# Patient Record
Sex: FEMALE | Race: WHITE | Employment: OTHER | URBAN - METROPOLITAN AREA
[De-identification: names, ages, dates, MRNs, and addresses within clinical notes are randomized per-mention and may not be internally consistent; named-entity substitution may affect disease eponyms.]

---

## 2018-06-09 ENCOUNTER — HOSPITAL ENCOUNTER (EMERGENCY)
Facility: CLINIC | Age: 68
Discharge: HOME | End: 2018-06-09
Attending: FAMILY MEDICINE | Admitting: FAMILY MEDICINE
Payer: COMMERCIAL

## 2018-06-09 VITALS
RESPIRATION RATE: 16 BRPM | TEMPERATURE: 98.4 F | HEIGHT: 66 IN | SYSTOLIC BLOOD PRESSURE: 157 MMHG | OXYGEN SATURATION: 99 % | DIASTOLIC BLOOD PRESSURE: 77 MMHG | HEART RATE: 77 BPM

## 2018-06-09 DIAGNOSIS — H10.211 CHEMICAL CONJUNCTIVITIS OF RIGHT EYE: ICD-10-CM

## 2018-06-09 PROCEDURE — 99283 EMERGENCY DEPT VISIT LOW MDM: CPT | Mod: Z6 | Performed by: FAMILY MEDICINE

## 2018-06-09 PROCEDURE — 99283 EMERGENCY DEPT VISIT LOW MDM: CPT

## 2018-06-09 RX ORDER — TOBRAMYCIN AND DEXAMETHASONE 3; 1 MG/ML; MG/ML
SUSPENSION/ DROPS OPHTHALMIC
Qty: 1 BOTTLE | Refills: 0 | Status: SHIPPED | OUTPATIENT
Start: 2018-06-09

## 2018-06-09 RX ORDER — BUPROPION HYDROCHLORIDE 75 MG/1
1 TABLET ORAL DAILY
COMMUNITY
Start: 2018-02-15 | End: 2019-02-15

## 2018-06-09 RX ORDER — FLUOXETINE 10 MG/1
30 CAPSULE ORAL DAILY
COMMUNITY
Start: 2018-02-15 | End: 2019-02-15

## 2018-06-09 RX ORDER — AMOXICILLIN 500 MG/1
4 CAPSULE ORAL
COMMUNITY
Start: 2017-11-01 | End: 2018-11-01

## 2018-06-09 RX ORDER — SIMVASTATIN 20 MG
1 TABLET ORAL DAILY
COMMUNITY
Start: 2017-12-26 | End: 2018-12-26

## 2018-06-09 RX ORDER — BUPROPION HYDROCHLORIDE 150 MG/1
2 TABLET ORAL DAILY
COMMUNITY
Start: 2018-02-15 | End: 2019-02-15

## 2018-06-09 ASSESSMENT — ENCOUNTER SYMPTOMS
EYE DISCHARGE: 0
EYE REDNESS: 1
PHOTOPHOBIA: 0
EYE ITCHING: 0
EYE PAIN: 1

## 2018-06-09 ASSESSMENT — VISUAL ACUITY
OS: 20/50
OD: 20/70

## 2018-06-09 NOTE — ED AVS SNAPSHOT
[facility] Emergency Department    [address] [facility] [address]    Phone:  [phone]    Fax:  [phone]                                       Gabriella Carballo   MRN: [id number]    Department:  [facility] Emergency Department   Date of Visit:  6/9/2018           After Visit Summary Signature Page     I have received my discharge instructions, and my questions have been answered. I have discussed any challenges I see with this plan with the nurse or doctor.    ..........................................................................................................................................  Patient/Patient Representative Signature      ..........................................................................................................................................  Patient Representative Print Name and Relationship to Patient    ..................................................               ................................................  Date                                            Time    ..........................................................................................................................................  Reviewed by Signature/Title    ...................................................              ..............................................  Date                                                            Time

## 2018-06-09 NOTE — ED PROVIDER NOTES
"  History     Chief Complaint   Patient presents with     Eye Problem     put her contact  in her eye instead of her eye drops. redness and swelling of eye, did rinse for about 10 min before coming in     HPI  Gabriella Carballo is a 68 year old female who here with eye problem.  Today she was going to put in some contacts and she inadvertently use the contact lens cleaning solution instead of the eyedrops in her right eye.  She had fairly immediate burning and discomfort from this.  She flushed immediately with water.  Her vision has been slightly blurry since this incident.  She has normally blurry vision due to a past history of keratoconus.  She has been using sunglasses today as she went to her granddaughter's high school graduation party.    She will be driving back to her home in [place] tomorrow although her  will be doing the majority of the driving.    Problem List:    There are no active problems to display for this patient.       Past Medical History:    No past medical history on file.    Past Surgical History:    No past surgical history on file.    Family History:    No family history on file.    Social History:  Marital Status:   [2]  Social History   Substance Use Topics     Smoking status: Not on file     Smokeless tobacco: Not on file     Alcohol use Not on file        Medications:      tobramycin-dexamethasone (TOBRADEX) 0.3-0.1 % ophthalmic susp         Review of Systems   Eyes: Positive for pain and redness. Negative for photophobia, discharge, itching and visual disturbance.       Physical Exam   BP: 157/77  Pulse: 77  Temp: 98.4  F (36.9  C)  Resp: 16  Height: 167.6 cm (5' 6\")  SpO2: 99 %  Visual Acuity-Left: 20/50  Visual Acuity-Right: 20/70      Physical Exam   Constitutional: She appears well-developed and well-nourished. No distress.   HENT:   Head: Normocephalic and atraumatic.   Eyes: Right eye exhibits no discharge and no exudate. No foreign body present in the right eye. " Right conjunctiva is injected (with marked edema).   Neck: Normal range of motion. Neck supple.   Skin: She is not diaphoretic.       ED Course     ED Course     Procedures               Critical Care time:  none               No results found for this or any previous visit (from the past 24 hour(s)).    Medications - No data to display    Assessments & Plan (with Medical Decision Making)     I have reviewed the nursing notes.    I have reviewed the findings, diagnosis, plan and need for follow up with the patient.   Patient has a chemical conjunctivitis with edema.  I will treat with eyedrop combination cortisone and antibiotic.  She will follow-up with her ophthalmologist when she gets back in [place] if not improving or if getting worse.  Sooner as needed.    New Prescriptions    TOBRAMYCIN-DEXAMETHASONE (TOBRADEX) 0.3-0.1 % OPHTHALMIC SUSP    1 drop to the affected eye/eyes every 2 hours x doses, then every 6 hours for 2 days       Final diagnoses:   Chemical conjunctivitis of right eye       6/9/2018   [facility] EMERGENCY DEPARTMENT     Jed Meyer MD  06/09/18 1862

## 2018-06-09 NOTE — DISCHARGE INSTRUCTIONS
Conjunctivitis Caused by Irritation     Your healthcare provider may prescribe eye drops to help relieve symptoms.     Conjunctivitis may be caused by allergies to animals, chemicals, or other irritants. This includes eye drops. Most eye drops contain chemicals (preservatives) that may irritate your eyes. The problem can keep coming back. This can lead to an eye infection. Treatment involves relieving your symptoms and avoiding the irritant. If you have an infection, it will be treated.  Allergies  Grass, pollen, dust, mold, and animals are common causes of allergies. They can make your eyes red, watery, and itchy. In most cases, both eyes are affected.  Treatment  The best way to control an allergy is to avoid its source. Cold compresses and eye drops can help reduce the swelling. They can also help relieve redness and itching. If your allergy is severe, your healthcare provider may prescribe eye drops or oral medicines. Symptoms may take several weeks to clear up.  Other irritants  Pollution, smoke, contact lenses, and makeup can irritate your eyes. Your eyes can get red, sore, puffy, and watery. One or both eyes may become irritated.  Treatment  The best thing to do is avoid the irritant. Artificial tears can help flush out the eye and lubricate the surface. Your healthcare provider may also prescribe eye drops to reduce swelling and relieve redness. In some cases, you may have to stop wearing contact lenses or certain types of eye makeup.  Date Last Reviewed: 10/1/2017    0481-1904 [employer]. [address] [place] [address], [place] [address]. All rights reserved. This information is not intended as a substitute for professional medical care. Always follow your healthcare professional's instructions.

## 2018-06-09 NOTE — ED NOTES
Pt was putting eye gtts in at 1200 today when she realized she put contact cleaning solution in her R eye rather than wetting gtts.  Pt has flushed R eye several times t/o the day.  Tetanus UTD per pt

## 2018-06-09 NOTE — ED AVS SNAPSHOT
[facility] Emergency Department    [address] [address]    Phone:  [phone]    Fax:  [phone]                                       Gabriella Carballo   MRN: [id number]    Department:  [facility] Emergency Department   Date of Visit:  6/9/2018           Patient Information     Date Of Birth          1950        Your diagnoses for this visit were:     Chemical conjunctivitis of right eye        You were seen by Jed Meyer MD.      Follow-up Information     Follow up with your eye doctor.    Why:  If symptoms worsen        Discharge Instructions         Conjunctivitis Caused by Irritation     Your healthcare provider may prescribe eye drops to help relieve symptoms.     Conjunctivitis may be caused by allergies to animals, chemicals, or other irritants. This includes eye drops. Most eye drops contain chemicals (preservatives) that may irritate your eyes. The problem can keep coming back. This can lead to an eye infection. Treatment involves relieving your symptoms and avoiding the irritant. If you have an infection, it will be treated.  Allergies  Grass, pollen, dust, mold, and animals are common causes of allergies. They can make your eyes red, watery, and itchy. In most cases, both eyes are affected.  Treatment  The best way to control an allergy is to avoid its source. Cold compresses and eye drops can help reduce the swelling. They can also help relieve redness and itching. If your allergy is severe, your healthcare provider may prescribe eye drops or oral medicines. Symptoms may take several weeks to clear up.  Other irritants  Pollution, smoke, contact lenses, and makeup can irritate your eyes. Your eyes can get red, sore, puffy, and watery. One or both eyes may become irritated.  Treatment  The best thing to do is avoid the irritant. Artificial tears can help flush out the eye and lubricate the surface. Your healthcare provider may also prescribe eye drops to  reduce swelling and relieve redness. In some cases, you may have to stop wearing contact lenses or certain types of eye makeup.  Date Last Reviewed: 10/1/2017    4237-2168 [employer]. [address] [place] [address], [place] [address]. All rights reserved. This information is not intended as a substitute for professional medical care. Always follow your healthcare professional's instructions.          24 Hour Appointment Hotline       To make an appointment at any [facility], call [phone] ([phone]). If you don't have a family doctor or clinic, we will help you find one. [facility] are conveniently located to serve the needs of you and your family.             Review of your medicines      START taking        Dose / Directions Last dose taken    tobramycin-dexamethasone 0.3-0.1 % ophthalmic susp   Commonly known as:  TOBRADEX   Quantity:  1 Bottle        1 drop to the affected eye/eyes every 2 hours x doses, then every 6 hours for 2 days   Refills:  0                Prescriptions were sent or printed at these locations (1 Prescription)                   [facility] [address] [address]    Telephone:  [phone]   Fax:  [phone]   Hours:                  E-Prescribed (1 of 1)         tobramycin-dexamethasone (TOBRADEX) 0.3-0.1 % ophthalmic susp                Orders Needing Specimen Collection     None      Pending Results     No orders found from 6/7/2018 to 6/10/2018.            Pending Culture Results     No orders found from 6/7/2018 to 6/10/2018.            Pending Results Instructions     If you had any lab results that were not finalized at the time of your Discharge, you can call the ED Lab Result RN at [phone]. You will be contacted by this team for any positive Lab results or changes in treatment. The nurses are available 7 days a week from 10A to 6:30P.  You can leave a message 24 hours per day and  they will return your call.        Test Results From Your Hospital Stay               Thank you for choosing [facility]       Thank you for choosing [facility] for your care. Our goal is always to provide you with excellent care. Hearing back from our patients is one way we can continue to improve our services. Please take a few minutes to complete the written survey that you may receive in the mail after you visit with us. Thank you!        Care EveryWhere ID     This is your Care EveryWhere ID. This could be used by other organizations to access your [facility] medical records  [id number]        Equal Access to Services     MICHELLE ALEXANDRE : Todd Rob, wabertrand dougherty, jose carlos kaalcedric goyal, migel joshi . [facility] [phone].    ATENCIÓN: Si habla español, tiene a gupta disposición servicios gratuitos de asistencia lingüística. Llame al [phone].    We comply with applicable federal civil rights laws and [place] laws. We do not discriminate on the basis of race, color, national origin, age, disability, sex, sexual orientation, or gender identity.            After Visit Summary       This is your record. Keep this with you and show to your community pharmacist(s) and doctor(s) at your next visit.